# Patient Record
Sex: FEMALE | Race: AMERICAN INDIAN OR ALASKA NATIVE
[De-identification: names, ages, dates, MRNs, and addresses within clinical notes are randomized per-mention and may not be internally consistent; named-entity substitution may affect disease eponyms.]

---

## 2017-10-12 ENCOUNTER — HOSPITAL ENCOUNTER (INPATIENT)
Dept: HOSPITAL 31 - C.ER | Age: 52
LOS: 4 days | Discharge: LEFT BEFORE DISCHARGE | DRG: 743 | End: 2017-10-16
Attending: PSYCHIATRY & NEUROLOGY | Admitting: PSYCHIATRY & NEUROLOGY
Payer: MEDICAID

## 2017-10-12 DIAGNOSIS — F20.9: ICD-10-CM

## 2017-10-12 DIAGNOSIS — F17.210: ICD-10-CM

## 2017-10-12 DIAGNOSIS — F11.23: ICD-10-CM

## 2017-10-12 DIAGNOSIS — F14.20: ICD-10-CM

## 2017-10-12 DIAGNOSIS — F31.9: ICD-10-CM

## 2017-10-12 DIAGNOSIS — F10.230: Primary | ICD-10-CM

## 2017-10-12 DIAGNOSIS — Z81.1: ICD-10-CM

## 2017-10-12 LAB
ALBUMIN/GLOB SERPL: 1.1 {RATIO} (ref 1–2.1)
ALP SERPL-CCNC: 92 U/L (ref 38–126)
ALT SERPL-CCNC: 39 U/L (ref 9–52)
AST SERPL-CCNC: 35 U/L (ref 14–36)
BACTERIA #/AREA URNS HPF: (no result) /[HPF]
BASOPHILS # BLD AUTO: 0 K/UL (ref 0–0.2)
BASOPHILS NFR BLD: 0.6 % (ref 0–2)
BILIRUB SERPL-MCNC: 0.4 MG/DL (ref 0.2–1.3)
BILIRUB UR-MCNC: NEGATIVE MG/DL
BUN SERPL-MCNC: 14 MG/DL (ref 7–17)
CALCIUM SERPL-MCNC: 8.7 MG/DL (ref 8.6–10.4)
CHLORIDE SERPL-SCNC: 104 MMOL/L (ref 98–107)
CO2 SERPL-SCNC: 22 MMOL/L (ref 22–30)
EOSINOPHIL # BLD AUTO: 0 K/UL (ref 0–0.7)
EOSINOPHIL NFR BLD: 0.7 % (ref 0–4)
ERYTHROCYTE [DISTWIDTH] IN BLOOD BY AUTOMATED COUNT: 15.1 % (ref 11.5–14.5)
ETHANOL SERPL-MCNC: 110 MG/DL (ref 0–10)
GLOBULIN SER-MCNC: 3.7 GM/DL (ref 2.2–3.9)
GLUCOSE SERPL-MCNC: 88 MG/DL (ref 65–105)
GLUCOSE UR STRIP-MCNC: NORMAL MG/DL
HCT VFR BLD CALC: 38.6 % (ref 34–47)
KETONES UR STRIP-MCNC: NEGATIVE MG/DL
LEUKOCYTE ESTERASE UR-ACNC: (no result) LEU/UL
LYMPHOCYTES # BLD AUTO: 3 K/UL (ref 1–4.3)
LYMPHOCYTES NFR BLD AUTO: 41.2 % (ref 20–40)
MCH RBC QN AUTO: 31.2 PG (ref 27–31)
MCHC RBC AUTO-ENTMCNC: 33.8 G/DL (ref 33–37)
MCV RBC AUTO: 92.3 FL (ref 81–99)
MONOCYTES # BLD: 0.4 K/UL (ref 0–0.8)
MONOCYTES NFR BLD: 6 % (ref 0–10)
NRBC BLD AUTO-RTO: 0.1 % (ref 0–2)
PH UR STRIP: 5 [PH] (ref 5–8)
PLATELET # BLD: 244 K/UL (ref 130–400)
PMV BLD AUTO: 7 FL (ref 7.2–11.7)
POTASSIUM SERPL-SCNC: 3.9 MMOL/L (ref 3.6–5.2)
PROT SERPL-MCNC: 8 G/DL (ref 6.3–8.3)
PROT UR STRIP-MCNC: NEGATIVE MG/DL
RBC # UR STRIP: NEGATIVE /UL
RBC #/AREA URNS HPF: < 1 /HPF (ref 0–3)
SODIUM SERPL-SCNC: 140 MMOL/L (ref 132–148)
SP GR UR STRIP: 1.01 (ref 1–1.03)
UROBILINOGEN UR-MCNC: NORMAL MG/DL (ref 0.2–1)
WBC # BLD AUTO: 7.2 K/UL (ref 4.8–10.8)
WBC #/AREA URNS HPF: 3 /HPF (ref 0–5)

## 2017-10-12 PROCEDURE — HZ2ZZZZ DETOXIFICATION SERVICES FOR SUBSTANCE ABUSE TREATMENT: ICD-10-PCS | Performed by: PSYCHIATRY & NEUROLOGY

## 2017-10-12 PROCEDURE — HZ59ZZZ INDIVIDUAL PSYCHOTHERAPY FOR SUBSTANCE ABUSE TREATMENT, SUPPORTIVE: ICD-10-PCS | Performed by: PSYCHIATRY & NEUROLOGY

## 2017-10-12 PROCEDURE — HZ46ZZZ GROUP COUNSELING FOR SUBSTANCE ABUSE TREATMENT, PSYCHOEDUCATION: ICD-10-PCS | Performed by: PSYCHIATRY & NEUROLOGY

## 2017-10-12 NOTE — C.PDOC
History Of Present Illness


Pt is here requesting detox from alcohol and heroin. 


Time Seen by Provider: 10/12/17 21:28


Chief Complaint (Nursing): Substance Abuse


History Per: Patient


Onset/Duration Of Symptoms: Days


Current Symptoms Are (Timing): Still Present


Suicide/Self Injury Attempted (Context): None


Modifying Factor(s): Alcohol, Narcotics, Cocaine


Associated Symptoms: denies: Suicidal Thoughts, Suicidal Plan


Additional History Per: Prior Records





Past Medical History


Reviewed: Historical Data, Nursing Documentation, Vital Signs


Vital Signs: 





 Last Vital Signs











Temp  98.1 F   10/12/17 21:04


 


Pulse  90   10/12/17 21:04


 


Resp  16   10/12/17 21:04


 


BP  100/66   10/12/17 21:04


 


Pulse Ox  96   10/12/17 21:04














- Medical History


PMH: Schizophrenia, Seizures





- [facility] Procedures











DETOXIFICATION SERVICES FOR SUBSTANCE ABUSE TREATMENT (04/04/16)








Family History: States: Unknown Family Hx





- Social History


Hx Tobacco Use: Yes


Hx Alcohol Use: Yes


Hx Substance Use: Yes (HEROIN, Cocaine)





- Immunization History


Hx Tetanus Toxoid Vaccination: Yes


Hx Influenza Vaccination: No


Hx Pneumococcal Vaccination: No





Review Of Systems


Except As Marked, All Systems Reviewed And Found Negative.


Constitutional: Negative for: Fever


Cardiovascular: Negative for: Chest Pain


Respiratory: Negative for: Shortness of Breath


Gastrointestinal: Negative for: Vomiting, Abdominal Pain


Musculoskeletal: Negative for: Neck Pain


Skin: Negative for: Rash


Neurological: Negative for: Weakness, Numbness





Physical Exam





- Physical Exam


Appears: Non-toxic, No Acute Distress


Skin: Normal Color, Warm, Dry, No Rash


Head: Atraumatic, Normacephalic


Eye(s): bilateral: Normal Inspection, PERRL, EOMI


Neck: Normal ROM, Supple


Cardiovascular: Rhythm Regular


Respiratory: Normal Breath Sounds, No Accessory Muscle Use


Gastrointestinal/Abdominal: Soft, No Tenderness


Extremity: Normal ROM


Neurological/Psych: Oriented x3, Normal Motor, Normal Sensation





ED Course And Treatment





- Laboratory Results


Result Diagrams: 


 10/12/17 21:53





 10/12/17 21:53


Lab Interpretation: No Acute Changes


Urine Pregnancy POC: Negative


O2 Sat by Pulse Oximetry: 96


Pulse Ox Interpretation: Normal


Progress Note: Pt is medically stable for detox admission.





Disposition


Counseled Patient/Family Regarding: Studies Performed, Diagnosis, Smoking 

Cessation





- Disposition


Disposition: HOSPITALIZED


Disposition Time: 22:41


Condition: STABLE





- Clinical Impression


Clinical Impression: 


 Alcohol dependence, Opioid dependence, Drug abuse








Decision To Admit





- Pt Status Changed To:


Hospital Disposition Of: Inpatient





- Admit Certification


Admit to Inpatient:: After my assessment, the patient will require 

hospitalization for at least two midnights.  This is because of the severity of 

symptoms shown, intensity of services needed, and/or the medical risk in this 

patient being treated as an outpatient.





- InPatient:


Physician Admission Certification: I certify that this patient requires 2 or 

more midnights of care for the following reason:: Detox.





- .


Bed Request Type: Detox


Admitting Physician: Yohana Parsons


Patient Diagnosis: 


 Alcohol dependence, Opioid dependence, Drug abuse

## 2017-10-13 RX ADMIN — Medication SCH TAB: at 10:09

## 2017-10-13 NOTE — PCM.PSYCH
Initial Psychiatric Evaluation





- Initial Psychiatric Evaluation


Type of Admission: Voluntary


Legal Status: Capacity


Chief Complaint (in patient's own words): 





"I am not doing good doc, I drink a lot" 


History of Present Illness and Precipitating Events: 





Pt is a 51 year old  female with no PMH who presented to the ED 

on 10/12/2017 requesting opioid, cocaine and alcohol detox. Pt reports she is 

 but has been  from her  for 5 years, has 12 children, 

lives with her mother in law and is currently unemployed. 





Pt reports that her "drug of choice" is alcohol which she states has been 

drinking since the age of 15 years old. She reports drinking 2-3 pints of 

alcohol a day. Pt states that she started using heroin at the age of 23 years 

old. She reports she uses approximately 10 bags of heroin daily, which she 

intranasally. She also uses crack daily which she reports started approximately 

5 years ago. Pt reports she has attended approximately 5 detoxes in the past as 

well as 2 rehabs. 





She reports she attends NA meetings sometimes and is currently in a Methadone 

program where they are trying " to do detox me from it". Pt reports that she 

was on 190mg of Methadone at one point and has been decreased daily by 10 mg 

which she reports "is too much doc and that is why I relapsed".  She reports 

that when she stops drinking she "shakes, I get nervous". Wenconfirmed her last 

dose as 25 mg yesterday





Pt smokes approximately 1/2 a pack of cigarettes daily. Pt denies any other 

substance use. 





As per her records, pt has had one previous psychiatric admission in the past 

at [facility] due to "Bipolar disorder or Schizophrenia" and he she was 

"hearing things all due to stress and depression in my life." Pt denies any 

current hallucinations, or SI. Pt also reports that her mother has a 

psychiatric history of bipolar disorder as well as "my mother is an alcoholic". 





Pt denies any current hallucinations, or SI. 








After care discussed. Pt states she is unsure what she wants to do. Pt is 

encouraged to attend [facility]'s IOP where she currently follows up to 

ensure she can receive care at the same facility. Pt states "yeah, that's a 

good idea. I also want NA and AA meetings too". Pt reports that perhaps she 

"would like to try Suboxone again since it was good for me, I was on it before 

and was clean for 4 years" She will speak with counselors and SW to arrange 

this. Pt is encouraged to do so.


Current Medications: 





Active Medications











Generic Name Dose Route Start Last Admin





  Trade Name Freq  PRN Reason Stop Dose Admin


 


Acetaminophen  650 mg  10/12/17 23:08  





  Tylenol 325mg Tab  PO   





  Q6 PRN   





  Pain, moderate (4-7)   


 


Al Hydrox/Mg Hydrox/Simethicone  30 ml  10/12/17 23:10  





  Maalox 30 Ml  PO   





  TID PRN   





  Indigestion / Heartburn   


 


Chlordiazepoxide  25 mg  10/13/17 00:00  10/13/17 13:17





  Librium  PO  10/16/17 23:59  25 mg





  Q6H KENNEDY   Administration





  Taper   


 


Chlordiazepoxide  25 mg  10/12/17 23:04  





  Librium  PO   





  Q4H PRN   





  Alcohol Withdrawal   


 


Clonidine HCl  0.1 mg  10/12/17 23:04  10/13/17 06:13





  Catapres  PO   0.1 mg





  Q4H PRN   Administration





  Symptoms of alcohol withdrawl   


 


Folic Acid  1 mg  10/13/17 10:00  10/13/17 10:09





  Folic Acid  PO   1 mg





  DAILY KENNEDY   Administration


 


Gabapentin  300 mg  10/13/17 10:00  10/13/17 10:09





  Neurontin  PO   300 mg





  BID KENNEDY   Administration


 


Hydroxyzine HCl  50 mg  10/12/17 23:08  





  Atarax  PO   





  Q6H PRN   





  Anxiety   


 


Loperamide HCl  2 mg  10/12/17 23:10  





  Imodium  PO   





  Q8 PRN   





  Diarrhea   


 


Methadone HCl  25 mg  10/13/17 10:00  10/13/17 11:07





  Methadone  PO  10/18/17 09:59  25 mg





  Q24H KENNEDY   Administration





  Taper   


 


Multivitamins  1 tab  10/13/17 10:00  10/13/17 10:09





  Hexavitamin  PO   1 tab





  DAILY KENNEDY   Administration


 


Ondansetron HCl  4 mg  10/12/17 23:10  





  Zofran Tab  PO   





  Q8 PRN   





  Nausea/Vomiting   


 


Pneumococcal Polyvalent Vaccine  0.5 ml  10/16/17 10:00  





  Pneumovax 23 Vaccine  IM  10/16/17 10:01  





  .ONCE ONE   


 


Thiamine HCl  100 mg  10/13/17 10:00  10/13/17 10:09





  Vitamin B1 Tab  PO   100 mg





  DAILY KENNEDY   Administration


 


Trazodone HCl  100 mg  10/12/17 23:08  10/13/17 00:48





  Desyrel  PO   100 mg





  HS PRN   Administration





  Insomnia   














Past Psychiatric History





- Past Psychiatric History


Pertinent Medical Hx (Current Medical&Sleep Prob, Allergies): 





 Allergies











Allergy/AdvReac Type Severity Reaction Status Date / Time


 


ibuprofen Allergy   Verified 10/12/17 21:06


 


tomato Allergy   Verified 10/12/17 21:06


 


ACIDS Allergy   Uncoded 10/12/17 21:06


 


KETCHUP Allergy   Uncoded 10/12/17 21:06








 





QUEtiapine [SEROquel] 400 mg PO DAILY 10/12/17 


Sertraline [Zoloft] 50 mg PO DAILY 10/12/17 











Review of Systems





- Neurological


Neurological: Tremor





- Psychiatric


Psychiatric: Abnormal Sleep Pattern, Anhedonia, Anxiety, Difficulty 

Concentrating.  absent: Hallucinations, Homicidal Ideation, Suicidal Ideation





Mental Status Examination





- Personal Presentation


Personal Presentation: Looks stated age





- Affect


Affect: Broad





- Motor Activity


Motor Activity: Calm





- Reliability in Providing Information


Reliability in Providing Information: Good





- Speech


Speech: Organized, Relevant, Coherent





- Mood


Mood: Neutral





- Formal Thought Process


Formal Thought Process: No Impairment





- Obsessions/Compulsions


Obsessions: No


Compulsions: No





- Cognitive Functions


Orientation: Person, Place, Situation, Time


Sensorium: Alert


Attention/Concentration: Attentive


Estimate of Intelligence: Average


Judgement: Intact, as evidence by: Insight regarding need for hospitalization


Memory: Recent intact, as evidence by: Ability to recall events of the day, 

Remote intact, as evidenced by: Abilit to recall sig. life events





- Risk


Risk: Withdrawal, Diminished functioning





- Strength & Assets Inventory


Strength & Assets Inventory: Family support, Interests/hobbies, Life experience

, Cooperative





DSM 5 DX





- DSM 5


DSM 5 Diagnosis: 





Opioid use disorder, severe 


Opioid withdrawal 


Cocaine use disorder, severe 


Alcohol use disorder, severe 


Alcohol withdrawal 





Bipolar disorder, unspecified 





- Recommended/Plan of Treatment


Treatment Recommendations and Plan of Treatment: 





Librium detox


Methadone detox 





Start: 





Librium taper 


Methadone taper 


Clonidine 0.1mg PO Q4 


Folic Acid 1mg PO DAILY 


Gabapentin 300mg PO BID 


Atarax 50mg PO Q6 


Thiamine 100mg PO DAILY 


Trazadone 100mg PO HS PRN 





Continue as needed medications


Gabapentin for augmentation


Attend groups and activities


Supportive therapy and psychoeducation


MI for abstinence


CBT for relapse prevention


Encourage MAT


Refer to rehab or IOP


Attend self-help groups as well





33 mins 





Projected ELOS: 4-5 days 


Prognosis: Good with treatment 


Discharge Plan and Discharge Criteria: 





Subutex detox


As needed medications


Gabapentin for augmentation


Attend groups and activities


Supportive therapy and psychoeducation


MI for abstinence


CBT for relapse prevention


Encourage MAT


Refer to rehab or IOP


Attend self-help groups as well








- Smoking Cessation


Smoking Cessation Initiated: Yes

## 2017-10-13 NOTE — PCM.BM
<Ema Lechuga - Last Filed: 10/13/17 02:29>





Treatment Plan Problems





- Problems identified on initial assessmt


  ** Alcohol Dependence


Date Initiated: 10/13/17


Time Initiated: 02:25


Assessment reference: NA





  ** Opiate dependence


Date Initiated: 10/13/17


Time Initiated: 02:30


Assessment reference: NA





Treatment assets and liabiliti


Patient Assests: cooperative, ADL independent, negotiates basic needs, good 

interpersonal skills


Patient Liabilities: substance abuse, medical problems





- Milieu Protocol


Maintain good personal hygiene: daily Encourage regular showers, daily Remind 

patient to perform daily oral care, daily Assist patient to perform ADL's


Conduct patient checks and document Observation sheet: Q15 minutes


Maintain personal safety: every shift Educate patient to report safety concerns 

to staff, every shift Monitor environment for contraband/sharps


Medication safety: Monitor for expected outcome, potential side effects: every 

shift, Assess barriers to learning: every shift, Assess readiness for 

medication education: every shift





<Breana Marsh - Last Filed: 10/13/17 14:48>





Family Contact


Family involvement: Famliy/SO not involved


Family contact: Patient agrees to contact





- Goals for Treatment


Patient goals for treatment: Complete detox and attend IOP at [facility] where pt. is 

receiving psychiatric care.





Discharge/Continuing Care





- Education Needs


Education Needs: Patient Medication, Patient Diagnosis/Disease Process, Patient 

Coping Skills, Patient Anger Management skills, Patient Placement options, 

Patient Community resources, Patient Activities of Daily Living





- Discharge


Discharge Criteria: No longer exhibiting s/s of withdrawal, Reduction of target 

symptoms


Discharge to:: With Family





- Treatment Team Participation


Patient/Family/SO Statement: 





10/13/17 14:49


"I wanna go to [facility] for IOP...maybe I'll consider 

Suboxone."


Discussed with Family/SO: No


Was Patient/Family/SO present at Treatment Team Meeting: Yes





<Yohana Parsons - Last Filed: 10/14/17 11:02>





- Diagnosis


(1) Alcohol use disorder, severe, dependence


Status: Acute   


Interventions: 





10/14/17 11:01


* Assess 7x/week regarding severity of withdrawal


* Educate regarding risks, benefits, side effects and alternatives of 

medications


* Use Motivational Interviewing for abstinence


* Use CBT for relapse prevention


* Medication management for withdrawal symptoms


* Encourage medication assisted treatment


* 








(2) Opioid dependence


Status: Acute   


Interventions: 





10/14/17 11:01


* Assess 7x/week regarding severity of withdrawal


* Educate regarding risks, benefits, side effects and alternatives of 

medications


* Use Motivational Interviewing for abstinence


* Use CBT for relapse prevention


* Medication management for withdrawal symptoms


* Encourage medication assisted treatment


*

## 2017-10-14 RX ADMIN — Medication SCH TAB: at 09:43

## 2017-10-14 NOTE — PCM.PYCHPN
Psychiatric Progress Note





- Psychiatric Progress Note


Patient seen today, length of contact: 15 minutes


Patient Chief Complaint: 





I'm not feeling well. I need more medication.


Problems Identified/Issues Discussed: 





Patient seen. Chart reviewed. Case discussed with the staff.





Issues related to illness and treatment were discussed with the patient.





Reported compliant with treatment with no adverse affects.





Tolerating treatment very well.





Patient reported not feeling well. Still has a lot of withdrawal symptoms 

including abdominal pain, body aches, discharge he, difficult to stand.





Education provided to the patient about the need substance use and detox.





At the time of evaluationl, patient was awake alert oriented 3, had no 

delusions, no auditory or visual hallucinations, no suicidal ideations or 

homicidal ideations.


Diagnostic Results: 





Reviewed


DSM 5 Symptoms Update: 





Improvement with treatment


Medication Change: No


Medical Record Reviewed: Yes





Mental Status Examination





- Cognitive Function


Orientation: Person, Place, Situation, Time


Memory: Intact


Attention: WNL


Concentration: WNL


Association: [facility]


Fund of Knowledge: [facility]


Decription of patient's judgement and insights: 





Fair





- Mood


Mood: Depressed, Anxious





- Affect


Affect: Other (Appropriate)





- Speech


Speech: Appropriate





- Formal Thought Process


Formal Thought Process: No Impairment


Psychotic Thoughts and Behaviors: 





None





- Suicidal Ideation


Suicidal Ideation: No





- Homicidal Ideation


Homicidal Ideation: No





Goal/Treatment Plan





- Goal/Treatment Plan


Need for Continued Stay: Remain at risks for inpatient hospitalization, 

Discharge may exacerbated symptoms, Severe functional impairment


Progress Toward Problem(s) and Goals/Treatment Plan: 





Patient education


Supportive therapy


Continue treatment as before


Motivational interview for abstinence


CBT for relapse prevention


Estimated Date of D/C: 10/19/17





- Smoking Cessation


Smoking Cessation Initiated: No

## 2017-10-15 VITALS — RESPIRATION RATE: 18 BRPM

## 2017-10-15 RX ADMIN — Medication SCH: at 11:01

## 2017-10-15 NOTE — PCM.PYCHPN
Psychiatric Progress Note





- Psychiatric Progress Note


Patient seen today, length of contact: 15 minutes


Patient Chief Complaint: 





I'm not feeling well. Last night I vomited twice. Still I feel nauseous.


Problems Identified/Issues Discussed: 








Patient seen. Chart reviewed. Case discussed with the staff.





Issues related to illness and treatment were discussed with the patient.





Reported compliant with treatment with no adverse affects.





Tolerating treatment very well.





Patient reported not feeling well. Last night patient vomited twice and got 

Zofran, still patient reported feeling nauseous. Patient was evaluated by 

medical doctor last night in order abdominal x-ray to rule out him to spell 

obstruction. We will start Protonix in the morning as patient may have 

gastritis due to alcohol use. Other withdrawal symptoms are very mild.





At the time of evaluationl, patient was awake alert oriented 3, had no 

delusions, no auditory or visual hallucinations, no suicidal ideations or 

homicidal ideations.


Diagnostic Results: 








Reviewed


DSM 5 Symptoms Update: 





Some improvement with treatment


Medication Change: Yes (Start Protonix)


Medical Record Reviewed: Yes





Mental Status Examination





- Cognitive Function


Orientation: Person, Place, Situation, Time


Memory: Intact


Attention: WNL


Concentration: WNL


Association: [facility]


Fund of Knowledge: [facility]


Decription of patient's judgement and insights: 








Fair





- Mood


Mood: Neutral





- Affect


Affect: Other (Appropriate)





- Speech


Speech: Appropriate





- Formal Thought Process


Formal Thought Process: No Impairment


Psychotic Thoughts and Behaviors: 








None





- Suicidal Ideation


Suicidal Ideation: No





- Homicidal Ideation


Homicidal Ideation: No





Goal/Treatment Plan





- Goal/Treatment Plan


Need for Continued Stay: Remain at risks for inpatient hospitalization, 

Discharge may exacerbated symptoms, Severe functional impairment


Progress Toward Problem(s) and Goals/Treatment Plan: 








Patient education


Supportive therapy


We'll start Protonix


Continue rest of the treatment as before


Motivational interview for abstinence


CBT for relapse prevention


Estimated Date of D/C: 10/19/17





- Smoking Cessation


Smoking Cessation Initiated: No

## 2017-10-15 NOTE — RAD
PROCEDURE:  Radiographs of the chest and abdomen (obstructive series)



HISTORY:

Abdominal Pain  



COMPARISON:

No prior.



TECHNIQUE:

AP radiograph of the chest, with upright and supine radiographs of 

the abdomen.



FINDINGS:



CHEST:

Lungs: Clear.



Cardiovascular: Normal size heart. No pulmonary vascular congestion.



Pleura: No pleural fluid. No pneumothorax.



Other findings: None.



ABDOMEN AND PELVIS:

Bowel: Unremarkable bowel gas pattern. No evidence of mechanical 

obstruction.



Free air: None.



Bones:  Unremarkable.



Other findings: None.



IMPRESSION:

Unremarkable radiographs of chest and abdomen. No evidence of 

mechanical bowel obstruction.

## 2017-10-15 NOTE — CP.PCM.PCO
Physician Communication Note





- Physician Communication Note


Physician Communication Note: Please see above

## 2017-10-16 VITALS
OXYGEN SATURATION: 99 % | SYSTOLIC BLOOD PRESSURE: 135 MMHG | TEMPERATURE: 98 F | HEART RATE: 89 BPM | DIASTOLIC BLOOD PRESSURE: 89 MMHG

## 2017-10-16 NOTE — PCM.PYCHDC
Mental Status Examination





- Mental Status Examination


Orientation: Person, Place, Situation, Time


Memory: Intact


Mood: Anxious


Affect: Constricted


Speech: Appropriate


Attention: WNL


Concentration: WNL


Association: WNL


Fund of Knowledge: WNL


Formal Thought Process: No Impairment


Suicidal Ideation: No


Current Homicidal Ideation?: No





Discharge Summary





- Discharge Note


Reason for Hospitalization: 





Alcohol and heroin detox and withdrawal


Consultations:: List each consultation separately and include:  1. Reason for 

request.  2. Findings.  3. Follow-up


Summary of Hospital Course include:: 1. Description of specific treatment plan 

utilized for patients during their course of treatmen.  2. Summarize the time-

course for resolution of acute symptoms and/or regressed behaviors.  3. 

Describe issues identified and worked on during hospitalization.  4. Describe 

medication utilized.  5. Describe medical problems identified and treated.  6. 

Reassessment of suicide risk


Summary of Hospital Course: 


The pt was admitted for alcohol and heroin detox and started on treatment with 

psychotherapy, support, psychoeducation and medications. 


MI and CBT used.


Pt stated she felt better and wanted to be discharged over the weekend


Pt repeatedly ask to be discharged after admission.


Recommended Pt to stay and finish detox before being discharged.


Pt left against medical advice without waiting for the writer to arrive in AM (

even though she was told it would be 30 min)





- Final Diagnosis (DSM 5)


Condition upon Discharge: STABLE


DSM 5: 





Alcohol Use Disorder- Severe





Opioid Use Disorder - Severe


Disposition: AGAINST MEDICAL ADVICE


Follow-up Treatment Plan: 


Discussed Risks of leaving prior to completing Detox program


Use relapse prevention skills


Return to ER or call 911 if suicidal, homicidal or symptoms relapse.


Stay away from stress, alcohol and drugs.


See primary doctor regularly and get labs.    





- Smoking Cessation


Smoking Cessation Medication prescribed: No





- Antipsychotic Medications


Pt discharged on 2 or more routine antipsychotic medications: No

## 2018-08-15 ENCOUNTER — HOSPITAL ENCOUNTER (EMERGENCY)
Dept: HOSPITAL 31 - C.ER | Age: 53
Discharge: HOME | End: 2018-08-15
Payer: MEDICAID

## 2018-08-15 VITALS
RESPIRATION RATE: 20 BRPM | SYSTOLIC BLOOD PRESSURE: 103 MMHG | OXYGEN SATURATION: 97 % | DIASTOLIC BLOOD PRESSURE: 68 MMHG | HEART RATE: 92 BPM | TEMPERATURE: 99.1 F

## 2018-08-15 DIAGNOSIS — H66.91: Primary | ICD-10-CM

## 2018-08-15 DIAGNOSIS — F17.210: ICD-10-CM

## 2018-08-15 NOTE — C.PDOC
History Of Present Illness


51 y/o female presents to ED with c/o right ear pain associated with decreased 

hearing. Patient states she feels fullness to right ear and denies trauma, 

foreign body, headache, drainage, fever or any other complaints at this time. 


Time Seen by Provider: 08/15/18 12:05


Chief Complaint (Nursing): ENT Problem


History Per: Patient


History/Exam Limitations: None


Onset/Duration Of Symptoms: Days


Current Symptoms Are (Timing): Still Present


Quality (Ear): Pain W/Touch.  denies: Discharge, Foreign Body





Past Medical History


Reviewed: Historical Data, Nursing Documentation, Vital Signs


Vital Signs: 


 Last Vital Signs











Temp  99.1 F   08/15/18 12:04


 


Pulse  92 H  08/15/18 12:04


 


Resp  20   08/15/18 12:04


 


BP  103/68   08/15/18 12:04


 


Pulse Ox  97   08/15/18 13:40














- Medical History


PMH: Asthma, Bipolar Disorder, Schizophrenia, Seizures


Surgical History: No Surg Hx





- [facility] Procedures








DETOXIFICATION SERVICES FOR SUBSTANCE ABUSE TREATMENT (10/12/17)


GROUP  FOR SUBSTANCE ABUSE TREATMENT, PSYCHOEDUCATION (10/12/17)


INDIV PSYCHOTHERAPY FOR SUBSTANCE ABUSE TREATMENT, SUPPORT (10/12/17)








Family History: States: No Known Family Hx





- Social History


Hx Tobacco Use: Yes


Hx Alcohol Use: Yes


Hx Substance Use: Yes (HEROIN, Cocaine)





- Immunization History


Hx Tetanus Toxoid Vaccination: Yes


Hx Influenza Vaccination: No


Hx Pneumococcal Vaccination: No





Review Of Systems


Constitutional: Negative for: Fever, Chills


ENT: Positive for: Ear Pain.  Negative for: Ear Discharge


Skin: Negative for: Rash





Physical Exam





- Physical Exam


Appears: Non-toxic, No Acute Distress


Skin: Warm, Dry, No Rash


Head: Atraumatic, Normacephalic


Eye(s): bilateral: Normal Inspection


Ear(s): Left: TM Obscured By Wax, Right: Other (TM questionable perforation, 

cerumen noted to canal. No mastoid tenderness)


Oral Mucosa: Moist


Throat: Normal, No Erythema, No Exudate


Neurological/Psych: Oriented x3, Normal Speech, Normal Cognition





ED Course And Treatment


O2 Sat by Pulse Oximetry: 97 (RA)


Pulse Ox Interpretation: Normal





Disposition





- Disposition


Referrals: 


Behin,Babak, MD [Staff Provider] - 


Disposition: HOME/ ROUTINE


Disposition Time: 12:05


Condition: GOOD


Additional Instructions: 





FARZANEH FULLER, thank you for letting us take care of you today. Your provider 

was Arthur Al DO and you were treated for RT EAR PAIN. The emergency 

medical care you received today was directed at your acute symptoms. If you 

were prescribed any medication, please fill it and take as directed. It may 

take several days for your symptoms to resolve. Return to the Emergency 

Department if your symptoms worsen, do not improve, or if you have any other 

problems.





Please contact your doctor or call one of the physicians/clinics you have been 

referred to that are listed on the Patient Visit Information form that is 

included in your discharge packet. Bring any paperwork you were given at 

discharge with you along with any medications you are taking to your follow up 

visit. Our treatment cannot replace ongoing medical care by a primary care 

provider outside of the emergency department.





Thank you for allowing the [facility] team to be part of your care today.














Follow up with the ENT doctor in 3-4 days for re-evaluation and further 

management.


Prescriptions: 


Amoxicillin [Amoxil 500 mg Cap] 500 mg PO TID #21 cap


Instructions:  Ear Infections (Otitis Media) (DC)


Forms:  [facility] (English)





- Clinical Impression


Clinical Impression: 


 Otitis media








- Scribe Statement


The provider has reviewed the documentation as recorded by the Joseibe


[place]





All medical record entries made by the Joseibden were at my direction and 

personally dictated by me. I have reviewed the chart and agree that the record 

accurately reflects my personal performance of the history, physical exam, 

medical decision making, and the department course for this patient. I have 

also personally directed, reviewed, and agree with the discharge instructions 

and disposition.

## 2018-12-31 ENCOUNTER — HOSPITAL ENCOUNTER (INPATIENT)
Dept: HOSPITAL 31 - C.ER | Age: 53
LOS: 4 days | Discharge: HOME | DRG: 745 | End: 2019-01-04
Attending: PSYCHIATRIC UNIT | Admitting: PSYCHIATRIC UNIT
Payer: COMMERCIAL

## 2018-12-31 DIAGNOSIS — J45.909: ICD-10-CM

## 2018-12-31 DIAGNOSIS — F31.4: ICD-10-CM
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
## 2018-12-31 DIAGNOSIS — I10: ICD-10-CM

## 2018-12-31 DIAGNOSIS — F11.23: ICD-10-CM

## 2018-12-31 DIAGNOSIS — F17.210: ICD-10-CM

## 2018-12-31 DIAGNOSIS — F14.10: ICD-10-CM

## 2018-12-31 DIAGNOSIS — F10.230: Primary | ICD-10-CM

## 2018-12-31 LAB
ALBUMIN SERPL-MCNC: 4.3 {NULL, G/DL} (ref 3.5–5)
ALBUMIN/GLOB SERPL: 1.3 {NULL, NULL} (ref 1–2.1)
ALT SERPL-CCNC: 26 {NULL, U/L} (ref 9–52)
AST SERPL-CCNC: 27 {NULL, U/L} (ref 14–36)
BACTERIA #/AREA URNS HPF: (no result) {NULL, NULL}
BASOPHILS # BLD AUTO: 0 {NULL, K/UL} (ref 0–0.2)
BASOPHILS NFR BLD: 0.7 {NULL, %} (ref 0–2)
BILIRUB UR-MCNC: NEGATIVE {NULL, NULL}
BUN SERPL-MCNC: 13 {NULL, MG/DL} (ref 7–17)
CALCIUM SERPL-MCNC: 9.1 {NULL, MG/DL} (ref 8.6–10.4)
EOSINOPHIL # BLD AUTO: 0.1 {NULL, K/UL} (ref 0–0.7)
EOSINOPHIL NFR BLD: 0.9 {NULL, %} (ref 0–4)
ERYTHROCYTE [DISTWIDTH] IN BLOOD BY AUTOMATED COUNT: 13.5 {NULL, %} (ref 11.5–14.5)
GFR NON-AFRICAN AMERICAN: > 60 {NULL, NULL}
GLUCOSE UR STRIP-MCNC: NORMAL {NULL, MG/DL}
HGB BLD-MCNC: 14.3 {NULL, G/DL} (ref 11–16)
LEUKOCYTE ESTERASE UR-ACNC: (no result) {NULL, LEU/UL}
LYMPHOCYTES # BLD AUTO: 2.9 {NULL, K/UL} (ref 1–4.3)
LYMPHOCYTES NFR BLD AUTO: 45.1 {NULL, %} (ref 20–40)
MCH RBC QN AUTO: 31.2 {NULL, PG} (ref 27–31)
MCHC RBC AUTO-ENTMCNC: 34.1 {NULL, G/DL} (ref 33–37)
MCV RBC AUTO: 91.4 {NULL, FL} (ref 81–99)
MONOCYTES # BLD: 0.5 {NULL, K/UL} (ref 0–0.8)
MONOCYTES NFR BLD: 8 {NULL, %} (ref 0–10)
NEUTROPHILS # BLD: 2.9 {NULL, K/UL} (ref 1.8–7)
NEUTROPHILS NFR BLD AUTO: 45.3 {NULL, %} (ref 50–75)
NRBC BLD AUTO-RTO: 0 {NULL, %} (ref 0–2)
PH UR STRIP: 5 {NULL, NULL} (ref 5–8)
PLATELET # BLD: 234 {NULL, K/UL} (ref 130–400)
PMV BLD AUTO: 6.7 {NULL, FL} (ref 7.2–11.7)
PROT UR STRIP-MCNC: NEGATIVE {NULL, MG/DL}
RBC # BLD AUTO: 4.58 {NULL, MIL/UL} (ref 3.8–5.2)
RBC # UR STRIP: NEGATIVE {NULL, NULL}
SP GR UR STRIP: 1 {NULL, NULL} (ref 1–1.03)
SQUAMOUS EPITHIAL: 1 {NULL, /HPF} (ref 0–5)
UROBILINOGEN UR-MCNC: NORMAL {NULL, MG/DL} (ref 0.2–1)
WBC # BLD AUTO: 6.5 {NULL, K/UL} (ref 4.8–10.8)

## 2018-12-31 PROCEDURE — GZ56ZZZ INDIVIDUAL PSYCHOTHERAPY, SUPPORTIVE: ICD-10-PCS | Performed by: PSYCHIATRIC UNIT

## 2018-12-31 RX ADMIN — Medication SCH TAB: at 15:20

## 2018-12-31 NOTE — PCM.BM
<Xiang Bernard - Last Filed: 12/31/18 14:07>





Treatment assets and liabiliti


Patient Assests: cooperative, motivated, ADL independent, negotiates basic 

needs, good interpersonal skills


Patient Liabilities: poor support system, substance abuse





- Milieu Protocol


Maintain good personal hygiene: daily Encourage regular showers, every shift 

Remind patient to perform daily oral care, every shift Assist patient to perform

ADL's


Maintain personal safety: daily Educate patient to report safety concerns to 

staff, every shift Monitor environment for contraband/sharps


Medication safety: Monitor for expected outcome, potential side effects: daily, 

Assess barriers to learning: daily, Assess readiness for medication education: 

daily





<Endy Abraham - Last Filed: 01/02/19 20:58>





- Diagnosis


(1) Alcohol use disorder, severe, dependence


Status: Acute   


Interventions: 





01/02/19 20:58





* Assess 7x/week regarding severity of withdrawal


* Educate regarding risks, benefits, side effects and alternatives of 

  medications


* Use Motivational Interviewing for abstinence


* Use CBT for relapse prevention


* Medication management for withdrawal symptoms


* Encourage medication assisted treatment








(2) Opioid use disorder, severe, dependence


Status: Acute   


Interventions: 





01/02/19 20:58





* Assess 7x/week regarding severity of withdrawal


* Educate regarding risks, benefits, side effects and alternatives of 

  medications


* Use Motivational Interviewing for abstinence


* Use CBT for relapse prevention


* Medication management for withdrawal symptoms


* Encourage medication assisted treatment








(3) Cocaine use disorder, severe, dependence


Status: Acute   


Interventions: 





01/02/19 20:58





* Assess 7x/week regarding severity of withdrawal


* Educate regarding risks, benefits, side effects and alternatives of 

  medications


* Use Motivational Interviewing for abstinence


* Use CBT for relapse prevention


* Medication management for withdrawal symptoms


* Encourage medication assisted treatment

## 2018-12-31 NOTE — C.PDOC
History Of Present Illness


53 year old female presents to the ED requesting heroin, cocaine and alcohol 

detox. Patient has history of asthma, and admits to experiencing shortness of 

breath last night. Patient denies suicidal/homicidal ideation. 


Time Seen by Provider: 12/31/18 10:22


Chief Complaint (Nursing): Substance Abuse


History Per: Patient


History/Exam Limitations: no limitations


Onset/Duration Of Symptoms: Hrs


Current Symptoms Are (Timing): Still Present


Modifying Factor(s): Alcohol, Cocaine, Other (heroin )


Associated Symptoms: denies: Suicidal Thoughts, Suicidal Plan


Recent travel outside of the [place]: No


Additional History Per: Patient





Past Medical History


Reviewed: Historical Data, Nursing Documentation, Vital Signs


Vital Signs: 





                                Last Vital Signs











Temp  97.4 F L  12/31/18 10:20


 


Pulse  106 H  12/31/18 10:20


 


Resp  20   12/31/18 10:20


 


BP  113/74   12/31/18 10:20


 


Pulse Ox  91 L  12/31/18 10:20














- Medical History


PMH: Asthma, Bipolar Disorder, HTN, Schizophrenia


   Denies: Diabetes, Hepatitis, HIV, Chronic Kidney Disease, Seizures, Sexually 

Transmitted Disease


Surgical History: No Surg Hx





- [facility] Procedures











DETOXIFICATION SERVICES FOR SUBSTANCE ABUSE TREATMENT (10/12/17)


GROUP  FOR SUBSTANCE ABUSE TREATMENT, PSYCHOEDUCATION (10/12/17)


INDIV PSYCHOTHERAPY FOR SUBSTANCE ABUSE TREATMENT, SUPPORT (10/12/17)








Family History: States: Unknown Family Hx





- Social History


Hx Tobacco Use: Yes


Hx Alcohol Use: Yes


Hx Substance Use: Yes (HEROIN, Cocaine)





- Immunization History


Hx Tetanus Toxoid Vaccination: Yes


Hx Influenza Vaccination: No


Hx Pneumococcal Vaccination: No





Review Of Systems


Respiratory: Positive for: Shortness of Breath


Psych: Positive for: Other (alcohol, cocaine, heroin detox ).  Negative for: 

Suicidal ideation





Physical Exam





- Physical Exam


Appears: Non-toxic, No Acute Distress


Skin: Normal Color, Warm, Dry


Head: Atraumatic, Normacephalic


Eye(s): bilateral: Normal Inspection


Oral Mucosa: Moist


Neck: Supple


Chest: Symmetrical, No Deformity, No Tenderness


Cardiovascular: Rhythm Regular, No Murmur


Respiratory: No Rales, No Rhonchi, Wheezing (mild, expiratory )


Extremity: Normal ROM, Capillary Refill (less than 2 seconds )


Neurological/Psych: Oriented x3, Normal Speech, Normal Cognition





ED Course And Treatment





- Laboratory Results


Result Diagrams: 


                                 12/31/18 10:43





                                 12/31/18 10:43


O2 Sat by Pulse Oximetry: 91





Medical Decision Making


Medical Decision Making: 





Impression: 53 year old female requesting detox, complaining of shortness of 

breath 


Plan: 


* bloodwork 


* urinalysis 


* Albuterol INH 


* Prednisone PO 





Progress:


Bloodwork and urinalysis ordered and reviewed. 


Albuterol INH and Prednisone PO given. 





Labs reviewed with no acute findings. In my clinical judgment patient is 

medically cleared and stable for admission.


[facility] contacted for evaluation. As per [facility] patient is to be admitted under Dr Abraham service for detox





Disposition





- Disposition


Disposition: HOSPITALIZED


Disposition Time: 12:00


Condition: STABLE





- POA


Present On Arrival: None





- Clinical Impression


Clinical Impression: 


 Opioid dependence, Asthma








- PA / NP / Resident Statement


MD/DO has reviewed & agrees with the documentation as recorded.





- Scribe Statement


The provider has reviewed the documentation as recorded by the Scribe (Dannielle Humphreys)








All medical record entries made by the Scribe were at my direction and 

personally dictated by me. I have reviewed the chart and agree that the record 

accurately reflects my personal performance of the history, physical exam, 

medical decision making, and the department course for this patient. I have also

personally directed, reviewed, and agree with the discharge instructions and 

disposition.





Decision To Admit





- Pt Status Changed To:


Hospital Disposition Of: Inpatient





- Admit Certification


Admit to Inpatient:: After my assessment, the patient will require 

hospitalization for at least two midnights.  This is because of the severity of 

symptoms shown, intensity of services needed, and/or the medical risk in this 

patient being treated as an outpatient.





- InPatient:


Physician Admission Certification: I certify that this patient requires 2 or 

more midnights of care for the following reason:: Patient to be admitted for 

inpatient detox





- .


Bed Request Type: Detox


Admitting Physician: Endy Abraham


Patient Diagnosis: 


 Opioid dependence

## 2019-01-01 RX ADMIN — Medication SCH TAB: at 09:45

## 2019-01-01 NOTE — PCM.PSYCH
Initial Psychiatric Evaluation





- Initial Psychiatric Evaluation


Type of Admission: Voluntary


Legal Status: Capacity


Chief Complaint (in patient's own words): 





I need help for my substance use.


History of Present Illness and Precipitating Events: 





Patient is a 53 years old, , unemployed, -American female with 

history of bipolar disorder was admitted due to withdrawing from heroin, alcohol

and cocaine.





Opioid: Patient started using heroin at 22 years of age, was using 3 bundles of 

heroin daily, sniffing.  Last used yesterday.  Her longest period of abstinence 

was about 6 years from [id number].  Patient was in methadone maintenance 

treatment program 1 year ago.  History of detox and to rehab.





Alcohol: Started at 15 years of age, was drinking 2, 6 packs of beer and half 

pint of vodka daily.  Last used yesterday.  She drank 5 cans of beer.





Cocaine: Started at 30 years of age.  Patient was guarded about cocaine.





Cigarettes: Smokes half pack of cigarettes daily and is requesting for nicotine 

patch.





Patient was born in [place], has 10th grade of education.  Not working for last

10 years.   and has 10 grownup children from 2 different meals.  She 

lives with her mother-in-law.  Her height is 5 feet 4 inches and weight is 160 

pounds.


Current Medications: 





Active Medications











Generic Name Dose Route Start Last Admin





  Trade Name Freq  PRN Reason Stop Dose Admin


 


Al Hydrox/Mg Hydrox/Simethicone  30 ml  12/31/18 14:38  





  Maalox 30 Ml  PO   





  TID PRN   





  Indigestion / Heartburn   





     





     





     


 


Albuterol  1 puff  01/01/19 18:26  





  Ventolin Hfa 90 Mcg/Actuation (8 G)  INH   





  RQ6 PRN   





  Shortness of Breath   





     





     





     


 


Chlordiazepoxide  25 mg  01/01/19 18:00  01/01/19 17:08





  Librium  PO  01/05/19 17:59  25 mg





  Q6 KENNEDY   Administration





     





  Taper   





     





     


 


Clonidine HCl  0.1 mg  12/31/18 14:37  01/01/19 16:36





  Catapres  PO   0.1 mg





  Q4H PRN   Administration





  Symptoms of alcohol withdrawl   





     





     





     


 


Dicyclomine HCl  10 mg  12/31/18 14:39  01/01/19 16:36





  Bentyl  PO   10 mg





  Q6 PRN   Administration





  Muscle spasm   





     





     





     


 


Folic Acid  1 mg  12/31/18 16:00  01/01/19 09:45





  Folic Acid  PO   1 mg





  DAILY KENNEDY   Administration





     





     





     





     


 


Gabapentin  300 mg  12/31/18 18:00  01/01/19 17:08





  Neurontin  PO   300 mg





  TID KENNEDY   Administration





     





     





     





     


 


Hydroxyzine HCl  25 mg  12/31/18 14:42  01/01/19 12:20





  Atarax  PO   25 mg





  Q6 PRN   Administration





  Anxiety   





     





     





     


 


Ibuprofen  600 mg  12/31/18 14:38  





  Motrin Tab  PO   





  Q6 PRN   





  Pain, moderate (4-7)   





     





     





     


 


Loperamide HCl  2 mg  12/31/18 14:38  01/01/19 16:36





  Imodium  PO   2 mg





  Q8 PRN   Administration





  Diarrhea   





     





     





     


 


Methadone HCl  15 mg  01/01/19 10:00  01/01/19 09:45





  Methadone  PO  01/04/19 09:59  15 mg





  Q24H KENNEDY   Administration





     





  Taper   





     





     


 


Multivitamins  1 tab  12/31/18 14:45  01/01/19 09:45





  Hexavitamin  PO   1 tab





  DAILY KENNEDY   Administration





     





     





     





     


 


Nicotine  1 patch  01/01/19 12:15  01/01/19 12:20





  Nicoderm Cq  TD   1 patch





  DAILY KENNEDY   Administration





     





     





     





     


 


Ondansetron HCl  4 mg  12/31/18 14:38  01/01/19 16:36





  Zofran Tab  PO   4 mg





  Q8 PRN   Administration





  Nausea/Vomiting   





     





     





     


 


Sertraline HCl  50 mg  01/01/19 12:15  01/01/19 12:20





  Zoloft  PO   50 mg





  DAILY KENNEDY   Administration





     





     





     





     


 


Thiamine HCl  100 mg  12/31/18 14:45  01/01/19 09:45





  Vitamin B1 Tab  PO   100 mg





  DAILY KENNEDY   Administration





     





     





     





     


 


Trazodone HCl  50 mg  12/31/18 14:37  





  Desyrel  PO   





  HS PRN   





  Insomnia   





     





     





     














Past Psychiatric History





- Past Psychiatric History


Previous Treatment History: Inpatient


History of Abuse: 





None reported


History of ETOH/Drug Use: 





See HPI


History of Family Illness: 





Reported her mother uses heroin.


Pertinent Medical Hx (Current Medical&Sleep Prob, Allergies): 





                                    Allergies











Allergy/AdvReac Type Severity Reaction Status Date / Time


 


ibuprofen Allergy   Verified 12/31/18 10:23


 


tomato Allergy   Verified 12/31/18 10:23


 


ACIDS Allergy   Uncoded 12/31/18 10:23


 


KETCHUP Allergy   Uncoded 12/31/18 10:23








                                        





QUEtiapine [SEROquel] 400 mg PO DAILY 10/12/17 


Sertraline [Zoloft] 50 mg PO DAILY 10/12/17 


Amoxicillin [Amoxil 500 mg Cap] 500 mg PO TID #21 cap 08/15/18 





Asthma


Hypertension





Review of Systems





- Psychiatric


Psychiatric: As Per HPI, Anxiety, Irritability, Mood Swings





Mental Status Examination





- Personal Presentation


Personal Presentation: Looks stated age





- Affect


Affect: Other (Appropriate)





- Motor Activity


Motor Activity: Calm





- Reliability in Providing Information


Reliability in Providing Information: Fair





- Speech


Speech: Organized





- Mood


Mood: Anxious





- Formal Thought Process


Formal Thought Process: No Impairment





- Hallucinations/Delusions


Hallucinations: Other (None reported)


Delusions: Other





- Obsessions/Compulsions


Obsessions: None


Compulsions: None





- Cognitive Functions


Orientation: Person, Place, Situation, Time


Sensorium: Alert


Attention/Concentration: Attentive


Abstract Thinking: [place]


Estimate of Intelligence: Average


Judgement: Intact, as evidence by: Insight regarding need for hospitalization


Memory: Recent intact, as evidence by: Ability to recall events of the day, 

Remote intact, as evidenced by: Ability to recall historical events





- Risk


Risk: Withdrawal, Diminished functioning





- Strength & Assets Inventory


Strength & Assets Inventory: Family support, Cooperative





- Limitations


Limitations: Other





DSM 5 DX





- DSM 5


DSM 5 Diagnosis: 





Opioid use disorder severe


Alcohol use disorder severe


Cocaine use disorder severe


Bipolar I disorder depressed severe with psychotic features.





- Recommended/Plan of Treatment


Treatment Recommendations and Plan of Treatment: 





Patient education.


Supportive therapy.


CBT for relapse prevention.


MI for abstinence.


We will start methadone taper for opioid withdrawal symptoms.


We will start Librium taper for alcohol withdrawal symptoms.


Other PRN medications.


We will start her home medication.


Patient wants to go to methadone maintenance treatment program for follow-up 

care after discharge from the hospital.


Projected ELOS: 4-5 days





- Smoking Cessation


Smoking Cessation Initiated: Yes

## 2019-01-02 RX ADMIN — ALUMINUM HYDROXIDE AND MAGNESIUM HYDROXIDE PRN ML: 200; 200 SUSPENSION ORAL at 16:43

## 2019-01-02 RX ADMIN — Medication SCH TAB: at 09:09

## 2019-01-02 NOTE — PCM.PYCHPN
Psychiatric Progress Note





- Psychiatric Progress Note


Patient seen today, length of contact: 15 minutes


Patient Chief Complaint: 





I am not feeling better, I need more methadone.


Problems Identified/Issues Discussed: 





Patient seen, chart reviewed, case discussed with the staff.





Issues related to illness and treatment were discussed with the patient and 

staff.





Reported compliant with treatment with no adverse effect.





Calm and cooperative.





Reported not feeling better.  Still having withdrawal symptoms including 

shaking, sweating, body aches and headache.





Requesting for more methadone.





Awake, alert and oriented x3.





Mood reported as anxious.





Affect appropriate.





Memory intact.





Aftercare discussed with the patient.





Denied any delusions, auditory or visual hallucinations, suicidal ideations or 

homicidal ideations at the time of evaluation.


Medical Problems: 





Asthma


Hypertension


Diagnostic Results: 





Reviewed


Medication Change: No


Medical Record Reviewed: Yes





Mental Status Examination





- Cognitive Function


Orientation: Person, Place, Situation, Time


Memory: Intact


Attention: WNL


Concentration: WNL


Association: [facility]


Fund of Knowledge: [facility]


Decription of patient's judgement and insights: 





Fair





- Mood


Mood: Anxious





- Affect


Affect: Other (Appropriate)





- Speech


Speech: Appropriate





- Formal Thought Process


Formal Thought Process: No Impairment


Psychotic Thoughts and Behaviors: 





None





- Suicidal Ideation


Suicidal Ideation: No





- Homicidal Ideation


Homicidal Ideation: No





Goal/Treatment Plan





- Goal/Treatment Plan


Need for Continued Stay: Remain at risks for inpatient hospitalization, 

Discharge may exacerbated symptoms, Severe functional impairment


Progress Toward Problem(s) and Goals/Treatment Plan: 





Patient education.


Supportive therapy.


CBT for relapse prevention.


MI for abstinence.


Continue treatment as before.


Patient wants to go to methadone maintenance treatment program for follow-up 

care after discharge from the hospital.


Estimated Date of D/C: 01/04/19





- Smoking Cessation


Smoking Cessation Initiated: Yes

## 2019-01-03 RX ADMIN — Medication SCH: at 10:01

## 2019-01-03 NOTE — PCM.PYCHPN
Psychiatric Progress Note





- Psychiatric Progress Note


Patient seen today, length of contact: 15 minutes


Patient Chief Complaint: 





I am feeling better.


Problems Identified/Issues Discussed: 





Patient seen, chart reviewed, case discussed with the staff.





Issues related to illness and treatment were discussed with the patient and 

staff.





Reported compliant with treatment with no adverse effect.





Calm and cooperative.





Reported feeling better. Still having withdrawal symptoms including shaking, 

sweating, body aches and headache but less than before.





Requesting for more methadone.





Awake, alert and oriented x3.





Mood reported as anxious.





Affect appropriate.





Memory intact.





Aftercare discussed with the patient.





Denied any delusions, auditory or visual hallucinations, suicidal ideations or 

homicidal ideations at the time of evaluation.


Medical Problems: 





Asthma


Hypertension


Diagnostic Results: 





Reviewed


DSM 5 Symptoms Update: 





Improving with treatment


Medication Change: No


Medical Record Reviewed: Yes





Mental Status Examination





- Cognitive Function


Orientation: Person, Place, Situation, Time


Memory: Intact


Attention: WNL


Concentration: WNL


Association: WNL


Fund of Knowledge: WNL


Decription of patient's judgement and insights: 





Fair





- Mood


Mood: Anxious (Less than before)





- Affect


Affect: Other (Appropriate)





- Speech


Speech: Appropriate





- Formal Thought Process


Formal Thought Process: No Impairment


Psychotic Thoughts and Behaviors: 





None





- Suicidal Ideation


Suicidal Ideation: No





- Homicidal Ideation


Homicidal Ideation: No





Goal/Treatment Plan





- Goal/Treatment Plan


Need for Continued Stay: Remain at risks for inpatient hospitalization, 

Discharge may exacerbated symptoms, Severe functional impairment


Progress Toward Problem(s) and Goals/Treatment Plan: 





Patient education.


Supportive therapy.


CBT for relapse prevention.


MI for abstinence.


Continue treatment as before.


Patient wants to go to [facility], methadone maintenance treatment program for 

follow-up care after discharge from the hospital.


Estimated Date of D/C: 01/04/19





- Smoking Cessation


Smoking Cessation Initiated: Yes

## 2019-01-04 VITALS
HEART RATE: 114 BPM | SYSTOLIC BLOOD PRESSURE: 131 MMHG | RESPIRATION RATE: 20 BRPM | TEMPERATURE: 98.1 F | DIASTOLIC BLOOD PRESSURE: 94 MMHG

## 2019-01-04 VITALS — OXYGEN SATURATION: 97 %

## 2019-01-04 RX ADMIN — Medication SCH: at 09:18

## 2019-01-04 RX ADMIN — ALUMINUM HYDROXIDE AND MAGNESIUM HYDROXIDE PRN ML: 200; 200 SUSPENSION ORAL at 09:30

## 2019-01-04 NOTE — PCM.[email or web address]
Mental Status Examination





- Mental Status Examination


Orientation: Person, Place, Situation, Time


Memory: Intact


Mood: Neutral


Affect: Other (Appropriate)


Speech: Appropriate


Attention: WNL


Concentration: WNL


Association: WNL


Fund of Knowledge: WNL


Formal Thought Process: No Impairment


Description of patient's judgement and insight: 





Fair


Psychotic Thoughts and Behaviors: 





None


Suicidal Ideation: No


Current Homicidal Ideation?: No





Discharge Summary





- Discharge Note


Reason for Hospitalization: 





Opioid use disorder severe


Alcohol use disorder severe


Cocaine use disorder severe


Bipolar I disorder depressed severe with psychotic features


Laboratory Data: 





Reviewed


Consultations:: List each consultation separately and include:  1. Reason for 

request.  2. Findings.  3. Follow-up


Summary of Hospital Course include:: 1. Description of specific treatment plan 

utilized for patients during their course of treatmen.  2. Summarize the time-

course for resolution of acute symptoms and/or regressed behaviors.  3. Describe

issues identified and worked on during hospitalization.  4. Describe medication 

utilized.  5. Describe medical problems identified and treated.  6. Reassessment

of suicide risk


Summary of Hospital Course: 





Patient is a 53 years old, , unemployed, -American female with 

history of bipolar disorder was admitted due to withdrawing from heroin, alcohol

and cocaine.





Opioid: Patient started using heroin at 22 years of age, was using 3 bundles of 

heroin daily, sniffing.  Last used yesterday.  Her longest period of abstinence 

was about 6 years from 7303-8352.  Patient was in methadone maintenance 

treatment program 1 year ago.  History of detox and to rehab.





Alcohol: Started at 15 years of age, was drinking 2, 6 packs of beer and half 

pint of vodka daily.  Last used yesterday.  She drank 5 cans of beer.





Cocaine: Started at 30 years of age.  Patient was guarded about cocaine.





Cigarettes: Smokes half pack of cigarettes daily and is requesting for nicotine 

patch.





Patient was born in [place], has 10th grade of education.  Not working for last

10 years.   and has 10 grownup children from 2 different meals.  She 

lives with her mother-in-law.  Her height is 5 feet 4 inches and weight is 160 

pounds.





During his stay in the hospital patient was treated with methadone taper for 

opiate withdrawal symptoms, Librium taper for alcohol withdrawal symptoms. 

Patient was also started on other when necessary medications. Most of the time 

patient was isolative in her room resting. With above treatment patient started 

feeling better. Today patient was stable, had no withdrawal symptoms and ready 

for discharge from the hospital.





At the time of evaluation and discharge, patient was awake, alert and oriented 

3, had no delusions, no auditory or visual hallucinations, no suicidal 

ideations or homicidal ideations. Patient was discharged in a stable condition.





- Diagnosis


(1) Alcohol use disorder, severe, dependence


Current Visit: No   Status: Acute   





(2) Opioid use disorder, severe, dependence


Current Visit: Yes   Status: Acute   





(3) Cocaine use disorder, severe, dependence


Current Visit: Yes   Status: Acute   





- Final Diagnosis (DSM 5)


Condition upon Discharge: STABLE


Disposition: HOME/ ROUTINE


Follow-up Treatment Plan: 





Patient will go to [facility], for follow-up care after discharge from the 

hospital.


Prescriptions/Medication Reconciliation: 


Gabapentin [Neurontin] 300 mg PO TID #90 cap


Sertraline [Zoloft] 50 mg PO DAILY #30 tab


traZODone [Desyrel] 50 mg PO HS PRN #30 tab


 PRN Reason: Insomnia





- Smoking Cessation


Smoking Cessation Medication prescribed: No





- Antipsychotic Medications


Pt discharged on 2 or more routine antipsychotic medications: No